# Patient Record
(demographics unavailable — no encounter records)

---

## 2024-12-31 NOTE — HISTORY OF PRESENT ILLNESS
[de-identified] : S/p right TKR 12/13/24 [de-identified] : She is doing well. She is ambulating with a walker. She has home PT. She stopped taking Oxycodone and Tramadol because of GI issues. She is taking Celebrex, Tylenol and omeprazole. She is taking Aspirin for DVT prophylaxis.  [de-identified] : Her wound is well-healed her staples are removed she has full extension and flexes to 115 degrees good medial lateral and posterior stability she is walking full weightbearing. [de-identified] : 3 views of the right knee and AP of the left show the right knee with a DJO cemented posterior stabilized total knee replacement in excellent position and well-fixed she did have a previous fracture longitudinally of the lateral pole of the patella which is excellently aligned and the patella tracks well. [de-identified] : Return visit 6 weeks.

## 2025-02-06 NOTE — HISTORY OF PRESENT ILLNESS
[de-identified] : right TKR 12/13/24 [de-identified] : Ms. ELLEN COGAN  is a 78 year old female s/p right TKR on 12/13/25.  Overall, she is doing well.  She does not use an assistive device at home.  She used a walker today because of the weather.   She is able to do the stairs with a reciprocal pattern.  She is not taking any medications. [de-identified] : Her wound is healing very well she is walking full weightbearing her she has 5 degrees short of full extension and is flexing at past 215 degrees she has good medial lateral and posterior stability.  She says she is not having pain and is considerably better than she was preop. [de-identified] : She will continue with physical therapy return visit in 3 months.